# Patient Record
Sex: FEMALE | Race: WHITE
[De-identification: names, ages, dates, MRNs, and addresses within clinical notes are randomized per-mention and may not be internally consistent; named-entity substitution may affect disease eponyms.]

---

## 2020-05-27 ENCOUNTER — HOSPITAL ENCOUNTER (OUTPATIENT)
Dept: HOSPITAL 62 - SP | Age: 65
End: 2020-05-27
Attending: THORACIC SURGERY (CARDIOTHORACIC VASCULAR SURGERY)
Payer: COMMERCIAL

## 2020-05-27 DIAGNOSIS — Z95.2: ICD-10-CM

## 2020-05-27 DIAGNOSIS — I34.0: Primary | ICD-10-CM

## 2020-05-27 PROCEDURE — 93306 TTE W/DOPPLER COMPLETE: CPT

## 2020-05-27 NOTE — XCELERA REPORT
[address] [address]

                               Tel: [phone]

                               Fax: [phone]



                      Transthoracic Echocardiogram Report

_______________________________________________________________________________



Name: DELFINA HUERTA

MRN: [id number]                                Age: 64 yrs

Gender: Female                                 : 1955

Patient Status: Outpatient                     Patient Location: 

Account #: [id number]

Study Date: 2020 01:14 PM

Accession #: [id number]

_______________________________________________________________________________



Height: 64 in        Weight: 233 lb        BSA: 2.1 m2

_______________________________________________________________________________



Reason For Study: MV INSUFFICIECY





Ordering Physician: SAMUEL LOTT

Performed By: Michael Chung



_______________________________________________________________________________



Interpretation Summary

TECHNICALLY POOR STUDY. No biplane LVEF, aortic root measurement wrong, poor

AV visualization, No ABHIJIT calculated. No TAPSE done.



Findings.

Small posterior and apical pericardial effusion or fat pad.

Aortic root is calcified, poorly measured.

Aortic valve is poorly imaged, probably tissue prosthesis, peak velocity

1.26m/s.PPG6mm Hg, no AR seen.

Mild mitral annular calcification, No MVP, no MS, mild MR with no LA

enlargement (visual).

Mild concentric LVH with normal LVEF 65%,and LVDD by medial E/E', and m mode

showing B notch suggesting increased LVEDP. Segmental regional wall motion

abnormality consist of IVS hypokinesis and conduction delay. No LV

enlargement.

RV appears normal size for RA and RV. Probably no RVSD. Mild TR with RVSP 36mm

Hg indicationg mild pulm hypertension. No LA seen. IVC normal .

Summary ;

AV poorly imaged, AV prosthesis not well imaged PPG as above, no perivalvular

leak. Mild MR wit no MVP, or MS, and no LA enlargement. Mild conc. LVH with

LVDD, but LVEF 65%. Mild pulm hypertension RVSP 36mm Hg.



MMode/2D Measurements & Calculations

RVDd: 2.9 cm  LVIDd: 4.3 cm   FS: 31.7 %             Ao root diam: 2.8 cm

IVSd: 1.3 cm  LVIDs: 2.9 cm   EDV(Teich): 83.5 ml

              LVPWd: 1.1 cm   ESV(Teich): 33.4 ml    Ao root area: 6.1 cm2

                                                     LA dimension: 3.8 cm

                              EF(Teich): 60.0 %



Doppler Measurements & Calculations

MV E max monserrat:      MV P1/2t max monserrat:     Ao V2 max:         LV V1 max P.1 cm/sec       112.0 cm/sec          125.4 cm/sec       5.0 mmHg

MV A max monserrat:      MV P1/2t: 72.8 msec   Ao max P.3 mmHgLV V1 max:

91.8 cm/sec        MVA(P1/2t): 3.0 cm2                      111.6 cm/sec

MV E/A: 1.2        MV dec slope:                            LV dP/dt:

                                                            649.0 mmHg/s

                   450.3 cm/sec2

                   MV dec time: 0.23 sec

        _______________________________________________________________

PA V2 max:         TR max monserrat:           MV P1/2t-pr_phl:

101.2 cm/sec       304.1 cm/sec          72.8 msec

PA max P.1 mmHgTR max P.0 mmHg







I      WMSI = 1.31     % Normal = 69



















                                                            Segments  Size

X - Cannot              2 -                     4 -         1-2     small

Interpret   1 - Normal  Hypokinetic 3 - AkineticDyskinetic  3-5     moderate

5 -                                                         6-14    large

Aneurysmal                                                  15-16   diffuse









_______________________________________________________________________________

_______________________________________________________________________________

Electronically signed by:      Aron Howard      on 2020 08:26 PM



CC: SAMUEL LOTT Andre

## 2020-11-03 ENCOUNTER — HOSPITAL ENCOUNTER (OUTPATIENT)
Dept: HOSPITAL 62 - WI | Age: 65
End: 2020-11-03
Attending: PEDIATRICS
Payer: COMMERCIAL

## 2020-11-03 DIAGNOSIS — Z12.31: Primary | ICD-10-CM

## 2020-11-03 PROCEDURE — 77067 SCR MAMMO BI INCL CAD: CPT

## 2020-11-03 NOTE — WOMENS IMAGING REPORT
EXAM DESCRIPTION:  BILAT SCREENING MAMMO W/CAD



IMAGES COMPLETED DATE/TIME:  11/3/2020 12:46 pm



REASON FOR STUDY:  Z12.31 ENCNTR SCREEN MAMMOGRAM FOR MALIGNANT NEOPLASM OF BREAST Z12.31  ENCNTR SCR
EEN MAMMOGRAM FOR MALIGNANT NEOPLASM OF [place]



COMPARISON:  None.



EXAM PARAMETERS:  Standard craniocaudal and mediolateral oblique views of each breast recorded using 
digital acquisition.

Read with the assistance of CAD.

.[facility]  Version 9.2



LIMITATIONS:  None.



FINDINGS:  No suspicious masses, suspicious calcifications or architectural distortion. No areas of c
oncern.



IMPRESSION:  NEGATIVE MAMMOGRAM.  BIRADS 1



BREAST DENSITY:  a. The breasts are almost entirely fatty.



BIRAD:  ASSESSMENT:  1 NEGATIVE



RECOMMENDATION:  ROUTINE SCREENING

Please continue yearly bilateral screening mammography/tomosynthesis in November 2021



COMMENT:  The patient has been notified of the results by letter per [facility] requirements. Additional no
tification policies are in place for contacting patient with suspicious or incomplete findings.

Quality ID #[id number]: The [employer] recommends an annual screening mammogram for women
 aged 40 years or over. This facility utilizes a reminder system to ensure that all patients receive 
reminder letters, and/or direct phone calls for appointments. This includes reminders for routine scr
eening mammograms, diagnostic mammograms, or other Breast Imaging Interventions when appropriate.  Th
is patient will be placed in the appropriate reminder system.



TECHNICAL DOCUMENTATION:  FINDING NUMBER: (1)

ASSESSMENT: (1)

JOB ID:  [id number]

 2011 [employer]- All Rights Reserved



Reading location - IP/workstation name: [email or web address]